# Patient Record
Sex: MALE | Race: WHITE
[De-identification: names, ages, dates, MRNs, and addresses within clinical notes are randomized per-mention and may not be internally consistent; named-entity substitution may affect disease eponyms.]

---

## 2019-07-17 ENCOUNTER — HOSPITAL ENCOUNTER (EMERGENCY)
Dept: HOSPITAL 25 - ED | Age: 49
Discharge: HOME | End: 2019-07-17
Payer: COMMERCIAL

## 2019-07-17 DIAGNOSIS — Z88.0: ICD-10-CM

## 2019-07-17 DIAGNOSIS — R50.9: Primary | ICD-10-CM

## 2019-07-17 PROCEDURE — 36415 COLL VENOUS BLD VENIPUNCTURE: CPT

## 2019-07-17 PROCEDURE — 96360 HYDRATION IV INFUSION INIT: CPT

## 2019-07-17 PROCEDURE — 99282 EMERGENCY DEPT VISIT SF MDM: CPT

## 2019-07-17 PROCEDURE — 85025 COMPLETE CBC W/AUTO DIFF WBC: CPT

## 2019-07-17 PROCEDURE — 87040 BLOOD CULTURE FOR BACTERIA: CPT

## 2019-07-17 PROCEDURE — 71046 X-RAY EXAM CHEST 2 VIEWS: CPT

## 2019-07-17 PROCEDURE — 86140 C-REACTIVE PROTEIN: CPT

## 2019-07-17 PROCEDURE — 83605 ASSAY OF LACTIC ACID: CPT

## 2019-07-17 PROCEDURE — 80053 COMPREHEN METABOLIC PANEL: CPT

## 2019-07-17 NOTE — ED
HPI Febrile Illness





- HPI Summary


HPI Summary: 





Patient complains of progressive fever over the past 3 days with MAXIMUM 

TEMPERATURE 104.5 today.  Fevers controlled by ibuprofen and then returns.  

Denies cough, sore throat, headache, neck stiffness, ear pain, CP, SOB, N/V/D, 

abdominal pain, change in urine, change in BM.  Patient states recent diagnosis 

of tonsillar cancer by Dr. Meade.  Patient has appointment Friday 7/19 with 

Dr. Strickland of radiology.  Appointment for PET scan morning of 7/18/19.  

Otherwise denies medical history.





- History of Current Complaint


Chief Complaint: EDFever


Time Seen by Provider: 07/17/19 23:06


Hx Obtained From: Patient


Onset/Duration: Started Days Ago


Timing: Intermittent


Initial Severity: Mild


Current Severity: Mild


Pain Intensity: 2


Pain Scale Used: 0-10 Numeric


Aggravating Factors: Nothing


Alleviating Factors: Nothing


Associated Signs and Symptoms: Negative





- Allergy/Home Medications


Allergies/Adverse Reactions: 


 Allergies











Allergy/AdvReac Type Severity Reaction Status Date / Time


 


penicillamine Allergy  Unknown Verified 07/17/19 20:14





   Reaction  





   Details  














PMH/Surg Hx/FS Hx/Imm Hx


Endocrine/Hematology History: 


   Denies: Hx Anticoagulant Therapy, Hx Anemia


Cardiovascular History: 


   Denies: Hx Pacemaker/ICD


GI History: 


   Denies: Hx Jaundice


 History: 


   Denies: Hx Dialysis


EENT History: 


   Denies: Hx Deafness


Infectious Disease History: No


Infectious Disease History: 


   Denies: Traveled Outside the [place] in Last 30 Days





- Family History


Known Family History: Positive: Non-Contributory





- Social History


Alcohol Use: Rare


Substance Use Type: Reports: None


Smoking Status (MU): Never Smoked Tobacco





Review of Systems


Positive: Fever


Eyes: Negative


ENT: Negative


Cardiovascular: Negative


Respiratory: Negative


Gastrointestinal: Negative


Genitourinary: Negative


Musculoskeletal: Negative


Skin: Negative


Neurological: Negative


Psychological: Normal


All Other Systems Reviewed And Are Negative: Yes





Physical Exam


Triage Information Reviewed: Yes


Vital Signs On Initial Exam: 


 Initial Vitals











Temp Pulse Resp BP Pulse Ox


 


 97.8 F   84   16   148/70   97 


 


 07/17/19 20:10  07/17/19 20:10  07/17/19 20:10  07/17/19 20:10  07/17/19 20:10











Vital Signs Reviewed: Yes


Appearance: Positive: Well-Appearing


Skin: Positive: Warm, Skin Color Reflects Adequate Perfusion


Head/Face: Positive: Normal Head/Face Inspection


Eyes: Positive: Normal


ENT: Positive: Normal ENT inspection


Neck: Positive: Supple


Respiratory/Lung Sounds: Positive: Clear to Auscultation


Cardiovascular: Positive: Normal


Abdomen Description: Positive: Nontender


Musculoskeletal: Positive: Normal


Neurological: Positive: Normal


Psychiatric: Positive: Normal


AVPU Assessment: Alert





- Flash Coma Scale


Best Eye Response: 4 - Spontaneous


Best Motor Response: 6 - Obeys Commands


Best Verbal Response: 5 - Oriented


Coma Scale Total: 15





Diagnostics





- Vital Signs


 Vital Signs











  Temp Pulse Resp BP Pulse Ox


 


 07/17/19 22:08  99 F  69  16  146/69  98


 


 07/17/19 20:10  97.8 F  84  16  148/70  97














- Laboratory


Result Diagrams: 


 07/17/19 23:57





 07/17/19 23:57


Lab Statement: Any lab studies that have been ordered have been reviewed, and 

results considered in the medical decision making process.





Course/Dx





- Course


Course Of Treatment: Patient complains of progressive fever over the past 3 

days with MAXIMUM TEMPERATURE 104.5 today.  Fevers controlled by ibuprofen and 

then returns.  Denies cough, sore throat, headache, neck stiffness, ear pain, CP

, SOB, N/V/D, abdominal pain, change in urine, change in BM.  Patient states 

recent diagnosis of tonsillar cancer by Dr. Meade.  Patient has appointment 

Friday 7/19 with Dr. Strickland of radiology.  Appointment for PET scan morning of 7 /18/19.  Otherwise denies medical history.  Vital signs within normal limits.  

Labs unremarkable.  Discussed patient with Dr. Waggoner on call for ENT who 

referred me back to attending Dr. Bazan.  Patient has appointment with Dr. Strickland radiology and PET scan tomorrow.  Prophylactic clindamycin.  Patient 

understands and approved the plan.





- Diagnoses


Provider Diagnoses: 


 Fever








Discharge





- Sign-Out/Discharge


Documenting (check all that apply): Patient Departure


Patient Received Moderate/Deep Sedation with Procedure: No





- Discharge Plan


Condition: Stable


Disposition: HOME


Prescriptions: 


Clindamycin HCl 300 mg PO QID 7 Days #28 capsule


Patient Education Materials:  Fever in Adults (ED)


Referrals: 


Uli Harris MD [Primary Care Provider] - 


Additional Instructions: 


Take antibiotics as directed.  Alternate ibuprofen 600 mg with Tylenol 650 mg 

every 3 hours for control of fever.  Follow-up with your ENT doctor and 

radiologist for further evaluation.





- Billing Disposition and Condition


Condition: STABLE


Disposition: Home

## 2019-07-17 NOTE — XMS REPORT
Continuity of Care Document (CCD)

 Created on:2019



Patient:Ismael Ellis

Sex:Male

:1970

External Reference #:[id number]





Demographics







 Address  [address] [place] [address]

 

 Home Phone  [phone]

 

 Mobile Phone  [phone]

 

 Work Phone  [phone]

 

 Preferred Language  en

 

 Marital Status  Declined to Specify/Unknown

 

 Sabianist Affiliation  Unknown

 

 Race  Unknown

 

 Ethnic Group  Declined to Specify/Unknown









Author







 Name  German Meade MD

 

 Address  [address]



   Unavailable



   [place] [address]









Support







 Name  Relationship  Address  Phone

 

 Jennie Ellis  Wife  Unavailable  [phone]









Care Team Providers







 Name  Role  Phone

 

 Heather Ballard D.D.S.  Care Team Information   Unavailable

 

 Uli Harris MD  Primary Care Physician  Unavailable









Payers







 Date  Identification Numbers  Payment Provider  Subscriber

 

   Policy Number: [id number]  [facility]  Ismael Ellis









 PayID: [id number]   [id number]









 [place] [address]







Problems







 Active Problems  Provider  Date

 

 Localized swelling, mass and lump, neck  German Meade MD  Onset: 2019







Family History







 Date  Family Member(s)  Observation  Comments

 

   General  Cancer  







Social History







 Type  Date  Description  Comments

 

 Birth Sex    Unknown  

 

 Occupation      

 

 Tobacco Use  Start: Unknown End:  Former Cigarette Smoker 1  quit at age 28



   Unknown  Pack Daily  

 

 Tobacco Use  Start: Unknown  Never Smoked Cigars  

 

 Tobacco Use  Start: Unknown  Never Smoked A Pipe  

 

 Smokeless Tobacco    Current Tobacco Chewer  

 

 ETOH Use    Unknow Alchol History Or  



     Use  







Allergies, Adverse Reactions, Alerts







 Active Allergies  Reaction  Severity  Comments  Date

 

 Penicillin V      swelling  2019







Medications







 Active Medications  SIG  Qnty  Indications  Ordering Provider  Date

 

 Vitamins  daily      Unknown  







Vital Signs







 Date  Vital  Result  Comment

 

 2019 11:19am  Weight  216.00 lb  









 Weight  97.978 kg  

 

 Height  73 inches  6'1"

 

 Height in cm's  185.4 cm  

 

 BMI (Body Mass Index)  28.5 kg/m2  







Results







 Test  Date  Facility  Test  Result  H/L  Range  Note

 

 Laboratory test  2019  [facility]  Surgical  <
pending>      



 finding    c/o Department of Laboratories  Pathology        



     [place] [address] [phone]          







Procedures







 Date  Code  Description  Status

 

 2019  57441  Biopsy Lesion Of Oropharynx  Completed

 

 2019  47731  Fiberoptic Laryngoscopy  Completed







Encounters







 Type  Date  Location  Provider  Dx  Diagnosis

 

 Office Visit  2019  [place]  German Meade  R22.1  Localized



   11:15a  08  MD    swelling, mass and



           lump, neck









 C09.0  Malignant neoplasm of tonsillar fossa

 

 R13.10  Dysphagia, unspecified







Plan of Treatment

Future Appointment(s):2019  8:30 am - German Meade MD at [place]  - German Meade MDR22.1 Localized swelling, mass and lump, 
neckComments:Pending results of the biopsy, highly suspicious for carcinoma 
tonsillar fossa right side.  Appears to be a T1 lesion clinically N0C09.0 
Malignant neoplasm of tonsillar adirvH08.10 Dysphagia, unspecified

## 2019-07-17 NOTE — XMS REPORT
Continuity of Care Document (CCD)

 Created on:2019



Patient:Ismael Ellis

Sex:Male

:1970

External Reference #:[id number]





Demographics







 Address  [address] [place] [address]

 

 Home Phone  [phone]

 

 Mobile Phone  [phone]

 

 Work Phone  [phone]

 

 Preferred Language  en

 

 Marital Status  Declined to Specify/Unknown

 

 Congregational Affiliation  Unknown

 

 Race  Unknown

 

 Ethnic Group  Declined to Specify/Unknown









Author







 Name  German Meade MD

 

 Address  [address]



   Unavailable



   [place] [address]









Support







 Name  Relationship  Address  Phone

 

 Jennie Ellis  Wife  Unavailable  [phone]









Care Team Providers







 Name  Role  Phone

 

 Heather Ballard D.D.S.  Care Team Information   Unavailable

 

 Uli Harris MD  Primary Care Physician  Unavailable









Payers







 Date  Identification Numbers  Payment Provider  Subscriber

 

   Policy Number: [id number]  [facility]  Ismael Ellis









 PayID: [id number]   [id number]









 [place] [address]







Problems







 Active Problems  Provider  Date

 

 Localized swelling, mass and lump, neck  German Meade MD  Onset: 2019







Family History







 Date  Family Member(s)  Observation  Comments

 

   General  Cancer  







Social History







 Type  Date  Description  Comments

 

 Birth Sex    Unknown  

 

 Occupation      

 

 Tobacco Use  Start: Unknown End:  Former Cigarette Smoker 1  quit at age 28



   Unknown  Pack Daily  

 

 Tobacco Use  Start: Unknown  Never Smoked Cigars  

 

 Tobacco Use  Start: Unknown  Never Smoked A Pipe  

 

 Smokeless Tobacco    Current Tobacco Chewer  

 

 ETOH Use    Unknow Alchol History Or  



     Use  







Allergies, Adverse Reactions, Alerts







 Active Allergies  Reaction  Severity  Comments  Date

 

 Penicillin V      swelling  2019







Medications







 Active Medications  SIG  Qnty  Indications  Ordering Provider  Date

 

 Vitamins  daily      Unknown  







Vital Signs







 Date  Vital  Result  Comment

 

 2019  8:43am  Weight  216.00 lb  









 Weight  97.978 kg  

 

 Height  73 inches  6'1"

 

 Height in cm's  185.4 cm  

 

 BMI (Body Mass Index)  28.5 kg/m2  









 2019 11:19am  Weight  216.00 lb  









 Weight  97.978 kg  

 

 Height  73 inches  6'1"

 

 Height in cm's  185.4 cm  

 

 BMI (Body Mass Index)  28.5 kg/m2  







Results







 Test  Date  Facility  Test  Result  H/L  Range  Note

 

 Laboratory test  2019  [facility]  Surgical  SEE 
RESULT      1



 finding    c/o Department of Laboratories  Pathology  BELOW      



     [place] [address]          



     [phone]          









 1  SEE RESULT BELOW



   -----------------------------------------------------------------------------
---------------



   Name:  ISMAEL ELLIS                     : 1970    Attend Dr: 
Rui Meade MD



   Acct:  [id number]  Unit: [id number]  AGE: 48            Location:  [facility]



   Re19                        SEX: M             Status:    REG REF



   -----------------------------------------------------------------------------
---------------



   



   SPEC: [id number]             TERESA:       [facility] DR: Rui Meade MD



   REQ:  [id number]             RECD: [id number]



   STATUS: SOUT



   _



   ORDERED:  LEVEL 4, IMMUNO-FIRST



   COMMENTS: [id number]



   



   



   ADDENDUM



   



   Addendum: An immunohistochemical stain for p16 performed with appropriate 
control



   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 
etiology to this



   invasive neoplasm.



   



   Addendum Signed ______(signature on file)______ Imtiaz Solano MD  1241



   



   -----------------------------------------------------------------------------
---------------



   



   FINAL DIAGNOSIS



   



   



   Oropharynx, right tonsil, biopsy:



   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 
tonsillar



   tissue.  See comment.



   



   



   -----------------------------------------------------------------------------
---------------



   



   Comment: An immunohistochemical stain for p16 with appropriate



   controls is pending and will be reported in an addendum.



   



   



   CLINICAL HISTORY



   



   No history given



   



   



   GROSS DESCRIPTION



   



   The specimen is received in formalin labeled, Tonsil Mass-Right, and 
consists of a 1.3 x 1.1



   x 0.4 cm aggregate of tan white irregular soft tissue fragments, the largest 
of which



   measures up to 1.1 cm, admixed with scant red-brown blood clot.  The largest 
fragment is



   inked, serially sectioned and the specimen is entirely submitted in one 
cassette.



   



   ** CONTINUED ON NEXT PAGE **



   



   DEPARTMENT OF PATHOLOGY,  [address] [place] [address]



   Phone # [phone]      Fax #[phone]



   Imtiaz Solano M.D. Director     JOSE # [id number]



   



   



   



   RUN DATE: 19               [facility] LAB **LIVE**         
       PAGE    2



   



   -----------------------------------------------------------------------------
---------------



   Patient: ISMAEL ELLIS                      [id number]     (Continued)



   -----------------------------------------------------------------------------
---------------



   



   GROSS DESCRIPTION          (Continued)



   



   



   Signed by and Reported on: __________              Imtiaz Solano MD 07/
10/19 1359



   



   -----------------------------------------------------------------------------
---------------



   



   



   



   



   



   



   



   



   



   



   



   



   



   



   



   



   



   



   



   



   



   



   



   



   



   



   



   



   



   



   



   



   



   



   



   



   



   



   



   ** END OF REPORT **



   



   DEPARTMENT OF PATHOLOGY,  [address] [place] [address]



   Phone # [phone]      Fax #[phone]



   Imtiaz Solano M.D. Director     [facility] # [id number]







Procedures







 Date  Code  Description  Status

 

 2019  11947  Biopsy Lesion Of Oropharynx  Completed

 

 2019  95515  Fiberoptic Laryngoscopy  Completed







Encounters







 Type  Date  Location  Provider  Dx  Diagnosis

 

 Office Visit  2019  [place]  German Meade  C09.0  Malignant 
neoplasm



   8:30a  08  MD    of tonsillar fossa









 R13.10  Dysphagia, unspecified









 Office Visit  2019 11:15a  [place]  German Meade  R22.1  
Localized



     08  MD    swelling, mass



           and lump, neck









 C09.0  Malignant neoplasm of tonsillar fossa

 

 R13.10  Dysphagia, unspecified







Plan of Treatment

2019 - German Meade MDC09.0 Malignant neoplasm of tonsillar 
fossaComments:Scheduling the patient for a PET scan subsequent to that the 
patient should see Dr. Strickland.  We did discuss possible treatment with robotic 
surgery for primary siteR13.10 Dysphagia, unspecified

## 2019-07-18 VITALS — DIASTOLIC BLOOD PRESSURE: 76 MMHG | SYSTOLIC BLOOD PRESSURE: 139 MMHG

## 2019-07-18 LAB
ALBUMIN SERPL BCG-MCNC: 4.1 G/DL (ref 3.2–5.2)
ALBUMIN/GLOB SERPL: 1.3 {RATIO} (ref 1–3)
ALP SERPL-CCNC: 104 U/L (ref 34–104)
ALT SERPL W P-5'-P-CCNC: 65 U/L (ref 7–52)
ANION GAP SERPL CALC-SCNC: 8 MMOL/L (ref 2–11)
AST SERPL-CCNC: 38 U/L (ref 13–39)
BASOPHILS # BLD AUTO: 0.1 10^3/UL (ref 0–0.2)
BUN SERPL-MCNC: 10 MG/DL (ref 6–24)
BUN/CREAT SERPL: 8.5 (ref 8–20)
CALCIUM SERPL-MCNC: 9 MG/DL (ref 8.6–10.3)
CHLORIDE SERPL-SCNC: 105 MMOL/L (ref 101–111)
EOSINOPHIL # BLD AUTO: 0 10^3/UL (ref 0–0.6)
GLOBULIN SER CALC-MCNC: 3.1 G/DL (ref 2–4)
GLUCOSE SERPL-MCNC: 128 MG/DL (ref 70–100)
HCO3 SERPL-SCNC: 23 MMOL/L (ref 22–32)
HCT VFR BLD AUTO: 42 % (ref 42–52)
HGB BLD-MCNC: 14 G/DL (ref 14–18)
LYMPHOCYTES # BLD AUTO: 1.1 10^3/UL (ref 1–4.8)
MCH RBC QN AUTO: 29 PG (ref 27–31)
MCHC RBC AUTO-ENTMCNC: 34 G/DL (ref 31–36)
MCV RBC AUTO: 86 FL (ref 80–94)
MONOCYTES # BLD AUTO: 0.9 10^3/UL (ref 0–0.8)
NEUTROPHILS # BLD AUTO: 6.5 10^3/UL (ref 1.5–7.7)
NRBC # BLD AUTO: 0 10^3/UL
NRBC BLD QL AUTO: 0
PLATELET # BLD AUTO: 200 10^3/UL (ref 150–450)
POTASSIUM SERPL-SCNC: 3.8 MMOL/L (ref 3.5–5)
PROT SERPL-MCNC: 7.2 G/DL (ref 6.4–8.9)
RBC # BLD AUTO: 4.88 10^6 /UL (ref 4.18–5.48)
SODIUM SERPL-SCNC: 136 MMOL/L (ref 135–145)
WBC # BLD AUTO: 8.6 10^3/UL (ref 3.5–10.8)

## 2019-07-19 ENCOUNTER — HOSPITAL ENCOUNTER (EMERGENCY)
Dept: HOSPITAL 25 - ED | Age: 49
Discharge: HOME | End: 2019-07-19
Payer: COMMERCIAL

## 2019-07-19 VITALS — SYSTOLIC BLOOD PRESSURE: 122 MMHG | DIASTOLIC BLOOD PRESSURE: 69 MMHG

## 2019-07-19 DIAGNOSIS — R50.9: ICD-10-CM

## 2019-07-19 DIAGNOSIS — Z88.0: ICD-10-CM

## 2019-07-19 DIAGNOSIS — J32.2: ICD-10-CM

## 2019-07-19 DIAGNOSIS — R51: Primary | ICD-10-CM

## 2019-07-19 LAB
ALBUMIN SERPL BCG-MCNC: 3.9 G/DL (ref 3.2–5.2)
ALBUMIN/GLOB SERPL: 1.2 {RATIO} (ref 1–3)
ALP SERPL-CCNC: 125 U/L (ref 34–104)
ALT SERPL W P-5'-P-CCNC: 64 U/L (ref 7–52)
ANION GAP SERPL CALC-SCNC: 6 MMOL/L (ref 2–11)
AST SERPL-CCNC: 41 U/L (ref 13–39)
BASOPHILS # BLD AUTO: 0 10^3/UL (ref 0–0.2)
BUN SERPL-MCNC: 12 MG/DL (ref 6–24)
BUN/CREAT SERPL: 11.5 (ref 8–20)
CALCIUM SERPL-MCNC: 9.3 MG/DL (ref 8.6–10.3)
CHLORIDE SERPL-SCNC: 106 MMOL/L (ref 101–111)
EOSINOPHIL # BLD AUTO: 0.2 10^3/UL (ref 0–0.6)
GLOBULIN SER CALC-MCNC: 3.2 G/DL (ref 2–4)
GLUCOSE SERPL-MCNC: 108 MG/DL (ref 70–100)
HCO3 SERPL-SCNC: 25 MMOL/L (ref 22–32)
HCT VFR BLD AUTO: 40 % (ref 42–52)
HGB BLD-MCNC: 13.7 G/DL (ref 14–18)
LYMPHOCYTES # BLD AUTO: 1 10^3/UL (ref 1–4.8)
MCH RBC QN AUTO: 29 PG (ref 27–31)
MCHC RBC AUTO-ENTMCNC: 35 G/DL (ref 31–36)
MCV RBC AUTO: 85 FL (ref 80–94)
MONOCYTES # BLD AUTO: 0.8 10^3/UL (ref 0–0.8)
NEUTROPHILS # BLD AUTO: 3.6 10^3/UL (ref 1.5–7.7)
NRBC # BLD AUTO: 0 10^3/UL
NRBC BLD QL AUTO: 0.1
PLATELET # BLD AUTO: 245 10^3/UL (ref 150–450)
POTASSIUM SERPL-SCNC: 4.1 MMOL/L (ref 3.5–5)
PROT SERPL-MCNC: 7.1 G/DL (ref 6.4–8.9)
RBC # BLD AUTO: 4.7 10^6 /UL (ref 4.18–5.48)
RBC UR QL AUTO: (no result)
SODIUM SERPL-SCNC: 137 MMOL/L (ref 135–145)
TSH SERPL-ACNC: 3.85 MCIU/ML (ref 0.34–5.6)
WBC # BLD AUTO: 5.6 10^3/UL (ref 3.5–10.8)
WBC UR QL AUTO: (no result)

## 2019-07-19 PROCEDURE — 85025 COMPLETE CBC W/AUTO DIFF WBC: CPT

## 2019-07-19 PROCEDURE — 81003 URINALYSIS AUTO W/O SCOPE: CPT

## 2019-07-19 PROCEDURE — 86618 LYME DISEASE ANTIBODY: CPT

## 2019-07-19 PROCEDURE — 99285 EMERGENCY DEPT VISIT HI MDM: CPT

## 2019-07-19 PROCEDURE — 84443 ASSAY THYROID STIM HORMONE: CPT

## 2019-07-19 PROCEDURE — 36415 COLL VENOUS BLD VENIPUNCTURE: CPT

## 2019-07-19 PROCEDURE — 83605 ASSAY OF LACTIC ACID: CPT

## 2019-07-19 PROCEDURE — 86617 LYME DISEASE ANTIBODY: CPT

## 2019-07-19 PROCEDURE — 80053 COMPREHEN METABOLIC PANEL: CPT

## 2019-07-19 PROCEDURE — 81015 MICROSCOPIC EXAM OF URINE: CPT

## 2019-07-19 PROCEDURE — 87086 URINE CULTURE/COLONY COUNT: CPT

## 2019-07-19 PROCEDURE — 70450 CT HEAD/BRAIN W/O DYE: CPT

## 2019-07-19 PROCEDURE — 96374 THER/PROPH/DIAG INJ IV PUSH: CPT

## 2019-07-19 NOTE — ED
HPI Febrile Illness





- HPI Summary


HPI Summary: 





49 yo male presents to [facility] [place] with complaints of a headache. He tells me that 

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 Meade and referred to Dr. Strickland of radiation oncology. He tells me that 

this week beginning on 7/15 he developed a fever of around 103F and a headache 

that resolved with ibuprofen, but returned when the ibuprofen wore off. He was 

seen in the ED on 7/17 and labwork, exam, and CXR were normal. ENT was 

consulted and pt tells me that they did not think it was related to his recent 

dx of cancer. Pt was placed on clindamycin prophylactically and advised to 

alternate tylenol and ibuprofen. He started the Clindamycin yesterday and has 

been taking tylenol and ibuprofen alternating as instructed. He had a PET scan 

and follow up with Dr. Strickland today, where pt stated he was still having a 

headache, but no more fevers. Pt states that Dr. Strickland contacted pt's PCP (Dr. Harris) and they thought it best that he come to the ED instead of watchful 

waiting with clindamycin - per pt. Currently pt tells me that he has not had 

any fevers, but attributes this to taking tylenol or ibuprofen every 3 hours - 

last dose of either was 1100 today. He mentions that he has a headache to the 

back of his head, b/l earache, sinus pressure, and pressure behind his eyes. He 

mentions that he is usually a "phlegm-y person", but hasn't been recently and 

thinks he may be congested. He reports resolution of his headache with blowing 

his nose, but the headache will soon return. Has not been taking anything OTC 

other than tylenol/ibuprofen.





Denies dizziness, vision changes, radiation of pain, numbness, tingling, SOB, 

chest pain, abdominal pain, n/v.





- History of Current Complaint


Chief Complaint: EDFever


Time Seen by Provider: 07/19/19 17:26


Hx Obtained From: Patient


Initial Severity: Mild


Current Severity: Mild


Pain Intensity: 4


Pain Scale Used: 0-10 Numeric





- Allergy/Home Medications


Allergies/Adverse Reactions: 


 Allergies











Allergy/AdvReac Type Severity Reaction Status Date / Time


 


Penicillins AdvReac  See Comment Verified 07/19/19 15:44














PMH/Surg Hx/FS Hx/Imm Hx


Endocrine/Hematology History: 


   Denies: Hx Anticoagulant Therapy, Hx Anemia


Cardiovascular History: 


   Denies: Hx Cardiac Arrest, Hx Hypertension, Hx Pacemaker/ICD


Respiratory History: 


   Denies: Hx Asthma


GI History: 


   Denies: Hx Jaundice


 History: 


   Denies: Hx Dialysis


Sensory History: 


   Denies: Hx Deafness


Neurological History: 


   Denies: Hx CVA, Hx Migraine, Hx Transient Ischemic Attacks (TIA)


Psychiatric History: 


   Denies: Hx Anxiety, Hx Depression





- Immunization History


Immunizations Up to Date: Yes


Infectious Disease History: No


Infectious Disease History: 


   Denies: Traveled Outside the [place] in Last 30 Days





- Family History


Known Family History: Positive: Non-Contributory





- Social History


Occupation: Employed Full-time


Lives: With Family


Alcohol Use: Rare


Substance Use Type: Reports: None


Smoking Status (MU): Never Smoked Tobacco





Review of Systems


Positive: Fever - resolved


Eyes: Negative


Positive: Other - Sinus pain. Earache


Cardiovascular: Negative


Respiratory: Negative


Gastrointestinal: Negative


Genitourinary: Negative


Musculoskeletal: Negative


Skin: Negative


Positive: Headache


Psychological: Normal


All Other Systems Reviewed And Are Negative: Yes





Physical Exam





- Summary


Physical Exam Summary: 





 


GENERAL: NAD. WDWN. No pain distress.


SKIN: No rashes, sores, ulcers, masses, lesions. 


HEENT:


            Head: AT/NC. 


            Eyes: PERRLA. EOM intact. Conjunctiva clear without inflammation or 

discharge.


            Ears: Hearing grossly normal. TMs intact, no bulging, erythema, or 

edema. 


            Nose: Nasal mucosa pink and moist. NTTP maxillary and frontal 

sinus. 


            Throat: Posterior oropharynx without exudates, erythema, or 

tonsillar enlargement.  Uvula midline.


NECK: Supple. Nontender. FROM


CHEST:  CTAB. No r/r/w. No accessory muscle use. Breathing comfortably and in 

no distress.


CV:  RRR. Without m/r/g. Pulses intact. Brisk cap refill.


ABDOMEN:  Soft. NTTP. Bowel sounds present


MSK:   FROM in B/L UEs and LEs with symmetric strength.


NEURO: A&Ox3. 3 word recall, remote, recent memory, ability to follow 2-step 

directions, 


and attention intact. CN: II: Peripheral fields intact. Vision normal. III, IV, 

VI: EOMI. No nystagmus. PERRLA. V: Sensations intact and symmetric. Opens mouth 

and clenches teeth. VII: No facial asymmetry. Forehead wrinkles. Grins, shuts 

eyes, frowns, puffs cheeks. VIII: Hearing intact to finger rub. IX, X: Swallows 

and coughs. Uvula midline. XI: Shrugs shoulders. Turns head against resistance. 

XII: No tongue deviation Finger-to-nose are intact. Gait with normal base. 

Romberg: maintains balance, no pronator drift. Normal speech. No facial 

drooping.


PSYCH: Age appropriate behavior.


Triage Information Reviewed: Yes


Vital Signs On Initial Exam: 


 Initial Vitals











Temp Pulse Resp BP Pulse Ox


 


 98.8 F   73   16   137/85   100 


 


 07/19/19 15:41  07/19/19 15:41  07/19/19 15:41  07/19/19 15:41  07/19/19 15:41











Vital Signs Reviewed: Yes





Diagnostics





- Vital Signs


 Vital Signs











  Temp Pulse Resp BP Pulse Ox


 


 07/19/19 15:41  98.8 F  73  16  137/85  100














- Laboratory


Lab Results: 


 Lab Results











  07/19/19 07/19/19 07/19/19 Range/Units





  16:10 16:10 16:10 


 


WBC  5.6    (3.5-10.8)  10^3/uL


 


RBC  4.70    (4.18-5.48)  10^6 /uL


 


Hgb  13.7 L    (14.0-18.0)  g/dL


 


Hct  40 L    (42-52)  %


 


MCV  85    (80-94)  fL


 


MCH  29    (27-31)  pg


 


MCHC  35    (31-36)  g/dL


 


RDW  14    (10-15)  %


 


Plt Count  245    (150-450)  10^3/uL


 


MPV  7.6    (7.4-10.4)  fL


 


Neut % (Auto)  64.6    %


 


Lymph % (Auto)  18.0    %


 


Mono % (Auto)  13.6    %


 


Eos % (Auto)  3.4    %


 


Baso % (Auto)  0.4    %


 


Absolute Neuts (auto)  3.6    (1.5-7.7)  10^3/ul


 


Absolute Lymphs (auto)  1.0    (1.0-4.8)  10^3/ul


 


Absolute Monos (auto)  0.8    (0-0.8)  10^3/ul


 


Absolute Eos (auto)  0.2    (0-0.6)  10^3/ul


 


Absolute Basos (auto)  0.0    (0-0.2)  10^3/ul


 


Absolute Nucleated RBC  0.0    10^3/ul


 


Nucleated RBC %  0.1    


 


Sodium   137   (135-145)  mmol/L


 


Potassium   4.1   (3.5-5.0)  mmol/L


 


Chloride   106   (101-111)  mmol/L


 


Carbon Dioxide   25   (22-32)  mmol/L


 


Anion Gap   6   (2-11)  mmol/L


 


BUN   12   (6-24)  mg/dL


 


Creatinine   1.04   (0.67-1.17)  mg/dL


 


Est GFR ( Amer)   91.5   (>60)  


 


Est GFR (Non-Af Amer)   75.6   (>60)  


 


BUN/Creatinine Ratio   11.5   (8-20)  


 


Glucose   108 H   ()  mg/dL


 


Lactic Acid    0.9  (0.5-2.0)  mmol/L


 


Calcium   9.3   (8.6-10.3)  mg/dL


 


Total Bilirubin   0.70   (0.2-1.0)  mg/dL


 


AST   41 H   (13-39)  U/L


 


ALT   64 H   (7-52)  U/L


 


Alkaline Phosphatase   125 H   ()  U/L


 


Total Protein   7.1   (6.4-8.9)  g/dL


 


Albumin   3.9   (3.2-5.2)  g/dL


 


Globulin   3.2   (2-4)  g/dL


 


Albumin/Globulin Ratio   1.2   (1-3)  











Result Diagrams: 


 07/19/19 16:10





 07/19/19 16:10


Lab Statement: Any lab studies that have been ordered have been reviewed, and 

results considered in the medical decision making process.





- CT


  ** No standard instances


CT Interpretation Completed By: Radiologist


Summary of CT Findings: FINDINGS:  Brain: No acute intracranial hemorrhage or 

extraaxial collection identified.  Grey/white differentiation is maintained 

with no evidence of acute infarct.  Ventricles: Normal configuration. No 

hydrocephalus.  Bones/joints: No acute fracture.  Sinuses: Mucosal thickening 

is present in the ethmoid sinuses. No air-fluid.  levels.  Mastoid air cells: 

No mastoid effusion.  Soft tissues: Normal.  IMPRESSION:  No acute intracranial 

findings.





Course/Dx





- Course


Course Of Treatment: At this time, I am unsure if his CA is related to his 

current symptoms. He has not had a documented fever in the ED today or on 

wednesday and his exam is WNL and is without meningismus. 1820 I discussed the 

case with Dr. Oviedo who also spoke with the pt. She recommended testing for 

lyme and doing a CT of his head/neck to further eval. If negative to switch to 

doxycycline to cover for sinusitis/lyme. 1853 I spoke with Dr. Stapleton as pt 

had a PET scan within the last 24 hours and am unsure if imaging needed to be 

repeated - he informs me that there was no inflammatory sign in the neck, no 

need for CT of neck, but that the head CT was nondiagnositc. Therefore, a head 

CT was ordered and was negative for intracranial findings, but did show some 

ethmoid inflammation. I discussed this with Dr. Oviedo and pt. Will treat as 

sinusitis with doxycycline and flonase and have him f/u with his PCP early next 

week for a recheck. Return to ED if fever returns or if his symptoms change or 

worsen.





- Diagnoses


Provider Diagnoses: 


 Headache, Fever, Ethmoid sinusitis








Discharge





- Sign-Out/Discharge


Documenting (check all that apply): Patient Departure


Patient Received Moderate/Deep Sedation with Procedure: No





- Discharge Plan


Condition: Stable


Disposition: HOME


Prescriptions: 


DOXYcycline CAP(*) [DOXYcycline 100MG CAP(*)] 100 mg PO BID #14 cap


Fluticasone NASAL SPRAY 50MCG* [Flonase NASAL SPRAY 50MCG*] 2 spray BOTH NARES 

DAILY #1 btl


Patient Education Materials:  Lyme Disease (ED), Sinusitis (ED), Acute Headache 

(DC)


Referrals: 


Uli Harris MD [Primary Care Provider] - 


Additional Instructions: 


If you develop a fever, shortness of breath, chest pain, new or worsening 

symptoms - please call your PCP or go to the ED immediately.


 





Your blood pressure was slightly elevated at todays visit. Please see your 

primary provider within 4 weeks for recheck and re-evaluation.





Your labwork was normal today, but your CT scan revealed thickening of your 

ethmoid sinuses - this could be due to a sinus infection.





We have also tested you for lyme disease, but these results will not be back 

until MONDAY.





--- Please stop the CLINDAMYCIN and start the DOXYCYCLINE (as this will treat 

both a sinus infection and lyme disease if positive)








May resume ibuprofen/tylenol tomorrow morning as directed





Please follow up with your primary doctor in 5-7 days for a recheck or return 

to the ED if your symptoms worsen or fever continues despite antibiotics





- Billing Disposition and Condition


Condition: STABLE


Disposition: Home

## 2019-07-27 ENCOUNTER — HOSPITAL ENCOUNTER (EMERGENCY)
Dept: HOSPITAL 25 - ED | Age: 49
Discharge: HOME | End: 2019-07-27
Payer: COMMERCIAL

## 2019-07-27 VITALS — SYSTOLIC BLOOD PRESSURE: 134 MMHG | DIASTOLIC BLOOD PRESSURE: 79 MMHG

## 2019-07-27 DIAGNOSIS — A69.20: ICD-10-CM

## 2019-07-27 DIAGNOSIS — Z87.891: ICD-10-CM

## 2019-07-27 DIAGNOSIS — Z88.0: ICD-10-CM

## 2019-07-27 DIAGNOSIS — C09.9: ICD-10-CM

## 2019-07-27 DIAGNOSIS — G51.0: Primary | ICD-10-CM

## 2019-07-27 PROCEDURE — 99282 EMERGENCY DEPT VISIT SF MDM: CPT

## 2019-07-27 NOTE — XMS REPORT
Continuity of Care Document (CCD)

 Created on:2019



Patient:Ismael Ellis

Sex:Male

:1970

External Reference #:[id number]





Demographics







 Address  [address] [place] [address]

 

 Home Phone  [phone]

 

 Mobile Phone  [phone]

 

 Work Phone  [phone]

 

 Preferred Language  en

 

 Marital Status  Declined to Specify/Unknown

 

 [employer] Affiliation  Unknown

 

 Race  Unknown

 

 Ethnic Group  Declined to Specify/Unknown









Author







 Name  German Meade MD

 

 Address  [address]



   Unavailable



   [place] [address]









Support







 Name  Relationship  Address  Phone

 

 Jennie Ellis  Wife  Unavailable  [phone]









Care Team Providers







 Name  Role  Phone

 

 Heather Ballard D.D.S.  Care Team Information   Unavailable

 

 Uli Harris MD  Primary Care Physician  Unavailable









Payers







 Date  Identification Numbers  Payment Provider  Subscriber

 

   Policy Number: [id number]  [facility]  Ismael Ellis









 Group Number: [id number]   [id number]

 

 PayID: [id number]  [place] [address]







Problems







 Active Problems  Provider  Date

 

 Localized swelling, mass and lump, neck  German Meade MD  Onset: 2019







Family History







 Date  Family Member(s)  Observation  Comments

 

   General  Cancer  







Social History







 Type  Date  Description  Comments

 

 Birth Sex    Unknown  

 

 Occupation      

 

 Tobacco Use  Start: Unknown End:  Former Cigarette Smoker 1  quit at age 28



   Unknown  Pack Daily  

 

 Tobacco Use  Start: Unknown  Never Smoked Cigars  

 

 Tobacco Use  Start: Unknown  Never Smoked A Pipe  

 

 Smokeless Tobacco    Current Tobacco Chewer  

 

 ETOH Use    Unknow Alchol History Or  



     Use  







Allergies, Adverse Reactions, Alerts







 Active Allergies  Reaction  Severity  Comments  Date

 

 Penicillin V      swelling  2019







Medications







 Active Medications  SIG  Qnty  Indications  Ordering Provider  Date

 

 Vitamins  daily      Unknown  

 

 Doxycycline Hyclate        Unknown  



       100mg Capsules          



           

 

 Fluticasone Propionate        Unknown  



          50mcg/Act Suspension          



           







Vital Signs







 Date  Vital  Result  Comment

 

 2019 11:42am  Weight  216.00 lb  









 Weight  97.978 kg  

 

 Height  73 inches  6'1"

 

 Height in cm's  185.4 cm  

 

 BMI (Body Mass Index)  28.5 kg/m2  









 2019  8:43am  Weight  216.00 lb  









 Weight  97.978 kg  

 

 Height  73 inches  6'1"

 

 Height in cm's  185.4 cm  

 

 BMI (Body Mass Index)  28.5 kg/m2  









 2019 11:19am  Weight  216.00 lb  









 Weight  97.978 kg  

 

 Height  73 inches  6'1"

 

 Height in cm's  185.4 cm  

 

 BMI (Body Mass Index)  28.5 kg/m2  







Results







 Test  Date  Facility  Test  Result  H/L  Range  Note

 

 Laboratory test  2019  [facility]  Point of Care  
91 mg/dL  N    1



 finding    c/o Department of Laboratories  Glucose        



     [place], [place] [id number] [phone]          

 

 Laboratory test  2019  [facility]  Surgical  SEE 
RESULT      2



 finding    c/o Department of Laboratories  Pathology  [place]      



     [place] [address]          



     [phone]          









 1  : [id number]

 

 2  SEE RESULT BELOW



   -----------------------------------------------------------------------------
---------------



   Name:  ISMAEL ELLIS                     : 1970    Attend Dr: 
Rui Meade MD



   Acct:  [id number]  Unit: [id number]  AGE: 48            Location:  [facility]



   Re19                        SEX: M             Status:    REG REF



   -----------------------------------------------------------------------------
---------------



   



   SPEC: [id number]             TERESA:       [facility] DR: Rui Meade MD



   REQ:  [id number]             RECD: [id number]



   STATUS: SOUT



   _



   ORDERED:  LEVEL 4, IMMUNO-FIRST



   COMMENTS: [id number]



   



   



   ADDENDUM



   



   Addendum: An immunohistochemical stain for p16 performed with appropriate 
control



   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 
etiology to this



   invasive neoplasm.



   



   Addendum Signed ______(signature on file)______ Imtiaz Solano MD  1241



   



   -----------------------------------------------------------------------------
---------------



   



   FINAL DIAGNOSIS



   



   



   Oropharynx, right tonsil, biopsy:



   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 
tonsillar



   tissue.  See comment.



   



   



   -----------------------------------------------------------------------------
---------------



   



   Comment: An immunohistochemical stain for p16 with appropriate



   controls is pending and will be reported in an addendum.



   



   



   CLINICAL HISTORY



   



   No history given



   



   



   GROSS DESCRIPTION



   



   The specimen is received in formalin labeled, Tonsil Mass-Right, and 
consists of a 1.3 x 1.1



   x 0.4 cm aggregate of tan white irregular soft tissue fragments, the largest 
of which



   measures up to 1.1 cm, admixed with scant red-brown blood clot.  The largest 
fragment is



   inked, serially sectioned and the specimen is entirely submitted in one 
cassette.



   



   ** CONTINUED ON NEXT PAGE **



   



   DEPARTMENT OF PATHOLOGY,  [address] [place] [address]



   Phone # [phone]      Fax #[phone]



   Imtiaz Solano M.D. Director     [facility] # [id number]



   



   



   



   RUN DATE: 19               [facility] LAB **LIVE**         
       PAGE    2



   



   -----------------------------------------------------------------------------
---------------



   Patient: ISMAEL ELLIS                      [id number]     (Continued)



   -----------------------------------------------------------------------------
---------------



   



   GROSS DESCRIPTION          (Continued)



   



   



   Signed by and Reported on: __________              Imtiaz Solano MD 07/
10/19 1359



   



   -----------------------------------------------------------------------------
---------------



   



   



   



   



   



   



   



   



   



   



   



   



   



   



   



   



   



   



   



   



   



   



   



   



   



   



   



   



   



   



   



   



   



   



   



   



   



   



   



   ** END OF REPORT **



   



   DEPARTMENT OF PATHOLOGY,  [address] [place] [address]



   Phone # [phone]      Fax #[phone]



   Imtiaz Solano M.D. Director     [facility] # [id number]







Procedures







 Date  Code  Description  Status

 

 2019  75305  Biopsy Lesion Of Oropharynx  Completed

 

 2019  67755  Fiberoptic Laryngoscopy  Completed







Encounters







 Type  Date  Location  Provider  Dx  Diagnosis

 

 Office Visit  2019  Blue,German Moore  C09.0  Malignant 
neoplasm



   11:45a  08  MD    of tonsillar fossa

 

 Office Visit  2019  Blue,German Moore  C09.0  Malignant 
neoplasm



   8:30a  08  MD    of tonsillar fossa









 R13.10  Dysphagia, unspecified









 Office Visit  2019 11:15a  Blue,After  German Meade  R22.1  
Localized



     08  MD    swelling, mass



           and lump, neck









 C09.0  Malignant neoplasm of tonsillar fossa

 

 R13.10  Dysphagia, unspecified







Plan of Treatment

2019 - German Meade MDC09.0 Malignant neoplasm of tonsillar 
fossaComments:Patient is scheduled to have radiation therapy, he has focal 
disease without metastatic evidence.  Wade going to have the patient at least 
get a second opinion for possible surgical management and reduce radiation 
exposure

## 2019-07-27 NOTE — ED
Complex/Multi-Sys Presentation





- HPI Summary


HPI Summary: 


This patient is a 48 year old M presenting to ED with a chief complaint of left-

sided facial numbness since 7/23/19. Patient was diagnosed with Lyme disease 

last week on 7/20/19 and started doxycycline. The patient rates the pain 5/10 

in severity. Symptoms aggravated by nothing. Symptoms alleviated by nothing. 

Patient reports pain behind the left ear and at the left posterior neck. His 

PCP ordered prednisone, but his mother had trouble taking it, so he has not 

taken it. Patient is also concerned about taking the Prednisone for bacterial 

Lyme since he has researched it and states it is better for viral infection. 

Patient denies visual problems. 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 

was diagnosed recently.





- History Of Current Complaint


Chief Complaint: EDGeneral


Time Seen by Provider: 07/27/19 10:57


Hx Obtained From: Patient


Onset/Duration: Gradual Onset, Lasting Days - 7/23/19, Still Present


Timing: Constant


Severity Currently: Moderate


Severity Initially: Moderate


Location: Pain At: - Left neck and behind L ear


Aggravating Factor(s): Nothing


Alleviating Factor(s): Nothing


Associated Signs And Symptoms: Positive: Other - Numbness


Related History: Recent Illness - Lyme disease





- Allergies/Home Medications


Allergies/Adverse Reactions: 


 Allergies











Allergy/AdvReac Type Severity Reaction Status Date / Time


 


Penicillins AdvReac  See Comment Verified 07/27/19 10:45














PMH/Surg Hx/FS Hx/Imm Hx


Endocrine/Hematology History: 


   Denies: Hx Anticoagulant Therapy, Hx Diabetes, Hx Anemia


Cardiovascular History: 


   Denies: Hx Cardiac Arrest, Hx Hypertension, Hx Pacemaker/ICD


Respiratory History: 


   Denies: Hx Asthma


GI History: 


   Denies: Hx Jaundice


 History: 


   Denies: Hx Dialysis, Hx Renal Disease


Sensory History: 


   Denies: Hx Deafness


Neurological History: 


   Denies: Hx CVA, Hx Migraine, Hx Transient Ischemic Attacks (TIA)


Psychiatric History: 


   Denies: Hx Anxiety, Hx Depression





- Cancer History


Cancer Type, Location and Year: tonsil





- Surgical History


Surgery Procedure, Year, and Place: Denies


Infectious Disease History: No


Infectious Disease History: 


   Denies: Traveled Outside the [place] in Last 30 Days





- Family History


Known Family History: Positive: Other - Negative: thyroid disease, Non-

Contributory





- Social History


Alcohol Use: Rare


Hx Substance Use: No


Substance Use Type: Reports: None


Hx Tobacco Use: Yes


Smoking Status (MU): Former Smoker





Review of Systems


Eyes: Negative - Visual problems


Musculoskeletal: Other - Pain behind left ear and posterior neck


Positive: Numbness - Left-sided facial numbness


All Other Systems Reviewed And Are Negative: Yes





Physical Exam





- Summary


Physical Exam Summary: 


Constitutional: Well-developed, Well-nourished, Alert. (-) Distressed


Skin: Warm, Dry


HENT: Left complete facial paralysis; Left tympanic membrane grey and pearly. 

Paraspinal L cervical TTP no midline C spine ttp


Eyes: Conjunctiva normal


Neck: Musculoskeletal ROM normal neck. (-) JVD, (-) Stridor


Cardio: Rhythm regular, rate normal, Heart sounds normal; Intact distal pulses; 

Radial pulses are 2+ and symmetric. (-) Murmur


Pulmonary/Chest wall: Effort normal. (-) Respiratory distress, (-) Wheezes, (-) 

Rales


Abd: Soft, (-) tenderness, (-) Distension, (-) Guarding, (-) Rebound


Musculoskeletal: (-) Edema


Lymph: (-) Cervical adenopathy


Neuro: Alert, Oriented x3. CN 7 palsy L face. Otherwise no focal neuro 

deficits. 


Psych: Mood and affect Normal


Triage Information Reviewed: Yes


Vital Signs On Initial Exam: 


 Initial Vitals











Temp Pulse Resp BP Pulse Ox


 


 97.6 F   72   18   148/75   98 


 


 07/27/19 10:43  07/27/19 10:43  07/27/19 10:43  07/27/19 10:43  07/27/19 10:43











Vital Signs Reviewed: Yes





Diagnostics





- Vital Signs


 Vital Signs











  Temp Pulse Resp BP Pulse Ox


 


 07/27/19 10:43  97.6 F  72  18  148/75  98














- Laboratory


Lab Statement: Any lab studies that have been ordered have been reviewed, and 

results considered in the medical decision making process.





Re-Evaluation





- Re-Evaluation


  ** First Eval


Re-Evaluation Time: 11:42


Comment: Discussed Dr. Kwan's advice with patient. Advised patient on return 

precautions.





Complex Multi-Symp Course/Dx


Course Of Treatment: 48-year-old male with a history of tonsillar cancer as 

well as recently diagnosed Lyme disease presents with left facial palsy.  

Physical exam consistent with Bell's palsy with complete paralysis of cranial 

nerve VII on the left face.  Patient is already on doxycycline.  She is out of 

the window for steroids.  Patient reports mild left neck pain as well as left 

ear pain, ear exam unremarkable.  Discussed with on-call neurologist who 

recommends the patient continue doxycycline but return for worsening symptoms 

as his knee concerning for central nervous system involvement of the Lyme 

disease.  Patient aware and will return on Monday if he is not feeling better, 

has worsening pain, headaches, confusion or fevers.





- Diagnoses


Provider Diagnoses: 


 Bell's palsy, Acute Lyme disease








- Physician Notifications


Discussed Care Of Patient With: Ac Kwan


Time Discussed With Above Provider: 11:36


Instructed by Provider To: Other - Discussed patient case with Dr. Kwan, 

neurologist, who states that the patient is outside the window for steroids and 

recommends he return for worsening symptoms or if not feeling improved by 7/29/ 19 as it could be signs of CNS infection.





Discharge





- Sign-Out/Discharge


Documenting (check all that apply): Patient Departure - Discharge


Patient Received Moderate/Deep Sedation with Procedure: No





- Discharge Plan


Condition: Stable


Disposition: HOME


Patient Education Materials:  Lyme Disease (ED), Winters Palsy (ED)


Referrals: 


Uli Harirs MD [Primary Care Provider] - 3 Days


Additional Instructions: 


You were seen in the emergency department for facial palsy as well as year and 

left-sided neck pain


Your exam and lab work is consistent with Lyme disease.  He had Bell's palsy 

from this.  Please continue taking her doxycycline.  Please return to the 

emergency department if you are not feeling better on Monday, have worsening 

headaches, neck pain, fevers, confusion or you are concerned.  This may suggest 

you need to be admitted inpatient for Lyme's disease of the central nervous 

system


Your CT showed a nodule on your thyroid, please get an outpatient ultrasound of 

your thyroid.





If any studies were not completed at the time of discharge you will be called 

with the relevant results.


Please follow up with your primary care doctor in next 2-3 days and return to 

emergency department for worsening or concerning symptoms.





- Billing Disposition and Condition


Condition: STABLE


Disposition: Home





- Attestation Statements


Document Initiated by Scribe: Yes


Documenting Scribe: Tam Mast


Provider For Whom Scribe is Documenting (Include Credential): Magdi Meléndez MD


Scribe Attestation: 


ITam, scribed for Magdi Meléndez MD on 07/27/19 at 1149. 


Scribe Documentation Reviewed: Yes


Provider Attestation: 


The documentation as recorded by the scribe, Tam Mast accurately reflects 

the service I personally performed and the decisions made by me, Magdi Meléndez MD


Status of Scribe Document: Viewed